# Patient Record
Sex: MALE | Race: WHITE | NOT HISPANIC OR LATINO | Employment: FULL TIME | URBAN - METROPOLITAN AREA
[De-identification: names, ages, dates, MRNs, and addresses within clinical notes are randomized per-mention and may not be internally consistent; named-entity substitution may affect disease eponyms.]

---

## 2019-05-28 ENCOUNTER — TREATMENT (OUTPATIENT)
Dept: PHYSICAL THERAPY | Facility: CLINIC | Age: 44
End: 2019-05-28

## 2019-05-28 ENCOUNTER — TRANSCRIBE ORDERS (OUTPATIENT)
Dept: PHYSICAL THERAPY | Facility: CLINIC | Age: 44
End: 2019-05-28

## 2019-05-28 DIAGNOSIS — S46.912A STRAIN OF LEFT SHOULDER, INITIAL ENCOUNTER: Primary | ICD-10-CM

## 2019-05-28 DIAGNOSIS — M25.512 ACUTE PAIN OF LEFT SHOULDER: Primary | ICD-10-CM

## 2019-05-28 PROCEDURE — 97161 PT EVAL LOW COMPLEX 20 MIN: CPT | Performed by: PHYSICAL THERAPIST

## 2019-05-28 PROCEDURE — 97110 THERAPEUTIC EXERCISES: CPT | Performed by: PHYSICAL THERAPIST

## 2019-05-28 NOTE — PROGRESS NOTES
Physical Therapy Initial Evaluation and Plan of Care        Subjective Evaluation    History of Present Illness  Mechanism of injury: Patient reports that he was attempting to lift a   up into storage container with several other employees. He states that he did not initially feel anything however 45 minutes later his shoulder started to bother him and progressively got worse throughout the day.  He is left handed.  He has a history of right Rotator cuff repair on the right and states that his left shoulder feels like his right did.        Patient Occupation: [employer]   Precautions and Work Restrictions: Light dutyQuality of life: good    Pain  Current pain rating: 3  At worst pain ratin  Quality: throbbing, radiating, sharp and dull ache  Relieving factors: medications and ice  Aggravating factors: outstretched reach, repetitive movement, overhead activity and lifting  Progression: worsening    Social Support  Lives with: spouse    Hand dominance: left    Treatments  Previous treatment: medication  Patient Goals  Patient goals for therapy: independence with ADLs/IADLs, increased strength, decreased pain and increased motion             Objective       Active Range of Motion   Left Shoulder   Flexion: 140 degrees   Abduction: 130 degrees   External rotation 45°: 60 degrees   Internal rotation 45°: 50 degrees     Strength/Myotome Testing     Left Shoulder     Planes of Motion   Flexion: 4-   Abduction: 4-   External rotation at 0°: 3+   Internal rotation at 0°: 4-     Tests     Left Shoulder   Positive active compression ([place]) and empty can.          Assessment & Plan     Assessment  Impairments: abnormal or restricted ROM, activity intolerance, lacks appropriate home exercise program and pain with function  Assessment details: Patient is a 43 y/o male who presents with increased left shoulder pain following a work related injury.  Upon evaluation he has limited AROM and strength throughout left  shoulder, specifically in ER at 0 degrees. He has positive full and empty can and [place] for possible internal derangement. Increased tenderness and guarding of left upper trap.   Prognosis: good  Functional Limitations: carrying objects, lifting, pulling, pushing, uncomfortable because of pain and reaching overhead  Goals  Plan Goals: Long Term Goals (8 weeks)    Patient is able to return to work/community activities with minimal to no discomfort  Patient is able to lift 45 lbs from floor to waist  Patient is able to lift 25 lbs from waist to shoulder  Patient is able to lift 15 lbs from shoulder to overhead  Patient is able to work overhead as needed for work/community activities.       Short Term Goals (4 weeks)    Patient is independent with HEP  Patient is able to sleep without being disrupted by pain.   Patient has full AROM/PROM of left shoulder  Patient has greater than 50% improvement overall.   Patient is able to perform bathing and grooming activities with use of involved UE extremity.                 Plan  Therapy options: will be seen for skilled physical therapy services  Planned modality interventions: TENS, ultrasound, thermotherapy (hydrocollator packs) and cryotherapy  Planned therapy interventions: manual therapy, soft tissue mobilization, strengthening, stretching, therapeutic activities, joint mobilization, home exercise program, functional ROM exercises, flexibility and body mechanics training  Frequency: 3x week  Duration in weeks: 8  Treatment plan discussed with: patient        Manual Therapy:    0     mins  79733;  Therapeutic Exercise:    20     mins  34598;     Neuromuscular Te:    0    mins  66920;    Therapeutic Activity:     0     mins  16310;     Gait Trainin     mins  74615;     Ultrasound:     0     mins  26316;    Work Hardening           0      mins 05101  Iontophoresis               0   mins 47373    Timed Treatment:   20   mins   Total Treatment:     50   mins    PT  SIGNATURE: Lily Briseno, PT   DATE TREATMENT INITIATED: 5/28/2019    Initial Certification  Certification Period: 8/26/2019  I certify that the therapy services are furnished while this patient is under my care.  The services outlined above are required by this patient, and will be reviewed every 90 days.     PHYSICIAN:       DATE:     Please sign and return via fax to [phone].. Thank you, [facility].

## 2019-05-31 ENCOUNTER — TREATMENT (OUTPATIENT)
Dept: PHYSICAL THERAPY | Facility: CLINIC | Age: 44
End: 2019-05-31

## 2019-05-31 DIAGNOSIS — M25.512 ACUTE PAIN OF LEFT SHOULDER: Primary | ICD-10-CM

## 2019-05-31 DIAGNOSIS — S46.912D STRAIN OF LEFT SHOULDER, SUBSEQUENT ENCOUNTER: ICD-10-CM

## 2019-05-31 PROCEDURE — 97110 THERAPEUTIC EXERCISES: CPT | Performed by: PHYSICAL THERAPIST

## 2019-05-31 PROCEDURE — 97530 THERAPEUTIC ACTIVITIES: CPT | Performed by: PHYSICAL THERAPIST

## 2019-05-31 NOTE — PROGRESS NOTES
"Physical Therapy Progress Note      5/31/2019  Lina Rivera APRN    Re: Constantin Hutchison  ________________________________________________________________    Mr. Constantin Hutchison, has attended 2/2 PT sessions.  Treatment has consisted of: patient education, therapeutic activity, and  therapeutic exercise.    S: Mr. Constantin Hutchison states: \"Shoulder is about the same.  I have been doing the exercises.  My pain really depends on how I move or what positions I get in. I'm pretty sure it's my rotator cuff; it feels like my right one did.\"    Subjective Evaluation    Pain  Pain scale: 1-2/10.  Location: (L) superior shoulder to lateral upper arm           Objective       Active Range of Motion   Left Shoulder   Flexion: 152 (pain begins at approx 80 deg flexion) degrees with pain  Extension: 50 (primarily tightness) degrees   Abduction: 102 (pain begins at approx 60 deg) degrees with pain  External rotation 90°: 48 degrees with pain  Internal rotation 90°: 43 degrees with pain    Strength/Myotome Testing     Additional Strength Details  Not assessed due to 2nd visit status     See Exercise, Manual, and Modality Logs for complete treatment.       Assessment & Plan     Assessment  Assessment details: Patient has been compliant with limited course of PT thus far, though he reports no improvement in symptoms.  Objective findings remain largely unchanged, though abduction AROM is less than at initial evaluation.  He experiences pain beginning at approx 80 deg AROM flexion and 60 deg AROM abduction.  There is concern for the possibility of internal derangement, but patient may benefit from a short course of continued PT in effort to improve pain and ROM.  Please advise after your exam.        Awaiting MD orders - patient to f/u with MD at 5pm this evening.         Manual Therapy:         mins  08659;  Therapeutic Exercise:    29     mins  74292;     Neuromuscular Te:        mins  54044;    Therapeutic " Activity:     9     mins  94837;     Gait Training:           mins  33472;     Ultrasound:          mins  26965;    Electrical Stimulation:         mins  83986 ( );  Dry Needling          mins self-pay    Timed Treatment:   38   mins   Total Treatment:     40   mins    Marion Mata PT, DPT  Physical Therapist  [place] License # [id number]

## 2019-06-03 ENCOUNTER — TREATMENT (OUTPATIENT)
Dept: PHYSICAL THERAPY | Facility: CLINIC | Age: 44
End: 2019-06-03

## 2019-06-03 DIAGNOSIS — S46.912D STRAIN OF LEFT SHOULDER, SUBSEQUENT ENCOUNTER: ICD-10-CM

## 2019-06-03 DIAGNOSIS — M25.512 ACUTE PAIN OF LEFT SHOULDER: Primary | ICD-10-CM

## 2019-06-03 PROCEDURE — 97014 ELECTRIC STIMULATION THERAPY: CPT | Performed by: PHYSICAL THERAPIST

## 2019-06-03 PROCEDURE — 97110 THERAPEUTIC EXERCISES: CPT | Performed by: PHYSICAL THERAPIST

## 2019-06-03 NOTE — PROGRESS NOTES
"Physical Therapy Daily Progress Note    Constantin Pattersonreimaryjo reports: \"The doctor is going to try to get the MRI pushed through.  I am waiting on it to get scheduled.  I leave tomorrow morning to go to [place] for one week for work.  She wants me to continue more therapy to keep my shoulder moving after I get back too.\"    Subjective     Objective   See Exercise, Manual, and Modality Logs for complete treatment.   *Initiated post and inf capsule stretches    Assessment & Plan     Assessment  Assessment details: Patient reports no significant improvement in (L) shoulder symptoms with PT efforts thus far despite HEP compliance.  He is able to initiate posterior and inferior capsular stretches with mild symptom irritation getting into/out of position but no symptom provocation during stretch itself.  He is unable to determine any significant change in symptoms immediately following ice and estim treatment this date.        Progress per Plan of Care with emphasis on shoulder joint mobility and soft tissue stretching.         Manual Therapy:         mins  70217;  Therapeutic Exercise:    36     mins  22962;     Neuromuscular Te:        mins  17224;    Therapeutic Activity:          mins  52244;     Gait Training:           mins  44497;     Ultrasound:          mins  43179;    Electrical Stimulation:    15     mins  03868 ( );  Dry Needling          mins self-pay    Timed Treatment:   36   mins   Total Treatment:     54   mins    Marion Mata PT, DPT  Physical Therapist  [place] License # [id number]    "

## 2019-06-10 ENCOUNTER — TREATMENT (OUTPATIENT)
Dept: PHYSICAL THERAPY | Facility: CLINIC | Age: 44
End: 2019-06-10

## 2019-06-10 DIAGNOSIS — M25.512 ACUTE PAIN OF LEFT SHOULDER: Primary | ICD-10-CM

## 2019-06-10 DIAGNOSIS — S46.912D STRAIN OF LEFT SHOULDER, SUBSEQUENT ENCOUNTER: ICD-10-CM

## 2019-06-10 PROCEDURE — 97110 THERAPEUTIC EXERCISES: CPT | Performed by: PHYSICAL THERAPIST

## 2019-06-10 PROCEDURE — 97014 ELECTRIC STIMULATION THERAPY: CPT | Performed by: PHYSICAL THERAPIST

## 2019-06-10 NOTE — PROGRESS NOTES
"Physical Therapy Daily Progress Note      Visit # 4      Subjective Evaluation    History of Present Illness    Subjective comment: Patient reports that his shoulder is still sore.  \"It just depends on the way I move it\".  Still waiting on MRI.        Objective   See Exercise, Manual, and Modality Logs for complete treatment.       Assessment & Plan     Assessment  Assessment details: Patient tolerated treatment fairly well. His ROM continues to improve and near full limits.  Still has pain with functional tasks. Awaiting MRI.     Plan  Plan details: Progress as tolerated.                      Manual Therapy:    0     mins  31761;  Therapeutic Exercise:    45     mins  82963;     Neuromuscular Te:    0    mins  69783;    Therapeutic Activity:     0     mins  75111;     Gait Trainin     mins  88124;     Ultrasound:     0     mins  20697;    Work Hardening           0      mins 70130  Iontophoresis               0   mins 10307    Timed Treatment:   45   mins   Total Treatment:     60   mins    Lily Briseno, PT  Physical Therapist  "

## 2019-06-12 ENCOUNTER — TREATMENT (OUTPATIENT)
Dept: PHYSICAL THERAPY | Facility: CLINIC | Age: 44
End: 2019-06-12

## 2019-06-12 DIAGNOSIS — S46.912D STRAIN OF LEFT SHOULDER, SUBSEQUENT ENCOUNTER: ICD-10-CM

## 2019-06-12 DIAGNOSIS — M25.512 ACUTE PAIN OF LEFT SHOULDER: Primary | ICD-10-CM

## 2019-06-12 PROCEDURE — 97014 ELECTRIC STIMULATION THERAPY: CPT | Performed by: PHYSICAL THERAPIST

## 2019-06-12 PROCEDURE — 97110 THERAPEUTIC EXERCISES: CPT | Performed by: PHYSICAL THERAPIST

## 2019-06-12 NOTE — PROGRESS NOTES
"Physical Therapy Daily Progress Note    Constantin Pattersonrandeeginger reports: \"My shoulder doesn't feel too bad on the moment but it depends on what I do and what position I get in.  I'm in the middle of a he said/she said trying to get the MRI done.\"    Subjective     Objective   See Exercise, Manual, and Modality Logs for complete treatment.   *Initiated serratus punches    Assessment & Plan     Assessment  Assessment details: Patient verbalizes no significant symptom improvement and expresses frustration at apparent lack of communication leading to no MRI approval yet.  He exhibits notably decreased (L) shoulder external rotation strength compared to (R) UE accompanied by pain.  Return to neutral from position of upper thoracic rotation is also reportedly painful and limited.  Anticipate 1 additional PT session to finalize current HEP as s/s allow.        Progress per Plan of Care. Await approval and scheduling of MRI. Reassess (L) shoulder AROM and strength.         Manual Therapy:         mins  16790;  Therapeutic Exercise:    47     mins  77909;     Neuromuscular Te:        mins  04083;    Therapeutic Activity:          mins  22289;     Gait Training:           mins  72870;     Ultrasound:          mins  52778;    Electrical Stimulation:    15     mins  79746 ( );  Dry Needling          mins self-pay    Timed Treatment:   47   mins   Total Treatment:     59   mins    Marion Mata PT, DPT  Physical Therapist  [place] License # [id number]    "

## 2019-06-14 ENCOUNTER — TREATMENT (OUTPATIENT)
Dept: PHYSICAL THERAPY | Facility: CLINIC | Age: 44
End: 2019-06-14

## 2019-06-14 DIAGNOSIS — S46.912D STRAIN OF LEFT SHOULDER, SUBSEQUENT ENCOUNTER: ICD-10-CM

## 2019-06-14 DIAGNOSIS — M25.512 ACUTE PAIN OF LEFT SHOULDER: Primary | ICD-10-CM

## 2019-06-14 PROCEDURE — 97530 THERAPEUTIC ACTIVITIES: CPT | Performed by: PHYSICAL THERAPIST

## 2019-06-14 PROCEDURE — 97014 ELECTRIC STIMULATION THERAPY: CPT | Performed by: PHYSICAL THERAPIST

## 2019-06-14 PROCEDURE — 97110 THERAPEUTIC EXERCISES: CPT | Performed by: PHYSICAL THERAPIST

## 2019-06-14 NOTE — PROGRESS NOTES
"Physical Therapy Daily Progress Note    Constantin Foster reports: \"My shoulder doesn't feel too bad today, but I also haven't been allowed to use it.  I still don't have the MRI approved. It sounds like the doctor's office and work comp are playing phone tag.\"    Subjective     Objective       Active Range of Motion   Left Shoulder   Flexion: 145 degrees with pain  Abduction: 139 degrees with pain  External rotation 90°: 47 degrees with pain  Internal rotation 90°: 50 degrees with pain    Strength/Myotome Testing     Left Shoulder     Planes of Motion   Flexion: 4- (with pain)   Extension: 4   Abduction: 4- (with pain)   External rotation at 0°: 3+ (with pain)   Internal rotation at 0°: 4      See Exercise, Manual, and Modality Logs for complete treatment.       Assessment & Plan     Assessment  Assessment details: Patient demonstrates no significant improvement in (L) shoulder AROM and in fact decreased external rotation AROM.  He expresses some frustration that MRI has not yet been scheduled and he has now attended all approved PT sessions.  Encouraged persistent HEP compliance while he awaits approval of imaging in order to maintain as much (L) shoulder ROM as possible and he verbalizes understanding.        Other - await approval and scheduling of MRI.         Manual Therapy:         mins  62901;  Therapeutic Exercise:    26     mins  79048;     Neuromuscular Te:        mins  15461;    Therapeutic Activity:     12     mins  28104;     Gait Training:           mins  20893;     Ultrasound:          mins  46551;    Electrical Stimulation:    15     mins  50747 ( );  Dry Needling          mins self-pay    Timed Treatment:   38   mins   Total Treatment:     53   mins    Marion Mata PT, DPT  Physical Therapist  [place] License # [id number]    "

## 2019-06-25 ENCOUNTER — TRANSCRIBE ORDERS (OUTPATIENT)
Dept: ADMINISTRATIVE | Facility: HOSPITAL | Age: 44
End: 2019-06-25

## 2019-06-25 DIAGNOSIS — S46.912A SHOULDER STRAIN, LEFT, INITIAL ENCOUNTER: Primary | ICD-10-CM

## 2019-07-09 ENCOUNTER — HOSPITAL ENCOUNTER (OUTPATIENT)
Dept: MRI IMAGING | Facility: HOSPITAL | Age: 44
Discharge: HOME | End: 2019-07-09
Admitting: NURSE PRACTITIONER

## 2019-07-09 ENCOUNTER — HOSPITAL ENCOUNTER (OUTPATIENT)
Dept: GENERAL RADIOLOGY | Facility: HOSPITAL | Age: 44
Discharge: HOME | End: 2019-07-09

## 2019-07-09 DIAGNOSIS — S46.912A SHOULDER STRAIN, LEFT, INITIAL ENCOUNTER: ICD-10-CM

## 2019-07-09 PROCEDURE — 77002 NEEDLE LOCALIZATION BY XRAY: CPT

## 2019-07-09 PROCEDURE — 0 GADOBENATE DIMEGLUMINE 529 MG/ML SOLUTION: Performed by: RADIOLOGY

## 2019-07-09 PROCEDURE — 73222 MRI JOINT UPR EXTREM W/DYE: CPT

## 2019-07-09 PROCEDURE — 25010000003 LIDOCAINE 1 % SOLUTION: Performed by: RADIOLOGY

## 2019-07-09 PROCEDURE — A9577 INJ MULTIHANCE: HCPCS | Performed by: RADIOLOGY

## 2019-07-09 PROCEDURE — 25010000002 IOPAMIDOL 61 % SOLUTION: Performed by: RADIOLOGY

## 2019-07-09 RX ORDER — LIDOCAINE HYDROCHLORIDE 10 MG/ML
20 INJECTION, SOLUTION INFILTRATION; PERINEURAL ONCE
Status: COMPLETED | OUTPATIENT
Start: 2019-07-09 | End: 2019-07-09

## 2019-07-09 RX ADMIN — LIDOCAINE HYDROCHLORIDE 2 ML: 10 INJECTION, SOLUTION INFILTRATION; PERINEURAL at 13:20

## 2019-07-09 RX ADMIN — GADOBENATE DIMEGLUMINE 0.05 ML: 529 INJECTION, SOLUTION INTRAVENOUS at 13:20

## 2019-07-09 RX ADMIN — IOPAMIDOL 2 ML: 612 INJECTION, SOLUTION INTRAVENOUS at 13:20

## 2019-07-16 ENCOUNTER — TREATMENT (OUTPATIENT)
Dept: PHYSICAL THERAPY | Facility: CLINIC | Age: 44
End: 2019-07-16

## 2019-07-16 DIAGNOSIS — M25.512 ACUTE PAIN OF LEFT SHOULDER: Primary | ICD-10-CM

## 2019-07-16 DIAGNOSIS — S46.912D STRAIN OF LEFT SHOULDER, SUBSEQUENT ENCOUNTER: ICD-10-CM

## 2019-07-16 PROCEDURE — 97530 THERAPEUTIC ACTIVITIES: CPT | Performed by: PHYSICAL THERAPIST

## 2019-07-16 PROCEDURE — 97014 ELECTRIC STIMULATION THERAPY: CPT | Performed by: PHYSICAL THERAPIST

## 2019-07-16 PROCEDURE — 97110 THERAPEUTIC EXERCISES: CPT | Performed by: PHYSICAL THERAPIST

## 2019-07-16 NOTE — PROGRESS NOTES
Physical Therapy Daily Progress Note      Visit # 7      Subjective Evaluation    History of Present Illness    Subjective comment: The shoulder feels like crap.  MRI done and he was told his shoulder was a mess.  He is being referred to orthopedic surgeon. Pain  Current pain rating: 3  At worst pain ratin           Objective   See Exercise, Manual, and Modality Logs for complete treatment.       Assessment/Plan                  Manual Therapy:         mins  48873;  Therapeutic Exercise:    26     mins  99727;     Neuromuscular Te:       mins  92889;    Therapeutic Activity:     12     mins  65194;     Gait Training:           mins  27737;     Ultrasound:          mins  11360;    Work Hardening                 mins 84106  Iontophoresis                  mins 35676    Timed Treatment:   38   mins   Total Treatment:     53   mins    Jessica Dickerson, PT  Physical Therapist

## 2019-07-17 ENCOUNTER — TREATMENT (OUTPATIENT)
Dept: PHYSICAL THERAPY | Facility: CLINIC | Age: 44
End: 2019-07-17

## 2019-07-17 DIAGNOSIS — M25.512 ACUTE PAIN OF LEFT SHOULDER: Primary | ICD-10-CM

## 2019-07-17 PROCEDURE — 97014 ELECTRIC STIMULATION THERAPY: CPT | Performed by: PHYSICAL THERAPIST

## 2019-07-17 PROCEDURE — 97110 THERAPEUTIC EXERCISES: CPT | Performed by: PHYSICAL THERAPIST

## 2019-07-17 NOTE — PROGRESS NOTES
Physical Therapy Daily Progress Note    Constantin Hutchison reports: Still waiting on approval from  contact to schedule appointment with the orthopedic surgeon.      Subjective     Objective   See Exercise, Manual, and Modality Logs for complete treatment.       Assessment & Plan     Assessment  Assessment details: During PT session, patient receives phone call from  adjustor stating that referral to orthopedic surgeon has been approved.  He also receives a separate phone call from [facility] referral department stating phone call has been placed to schedule appointment.  Patient is able to perform most activities without significant symptom provocation and for the time being demonstrates near full AAROM (L) shoulder flexion, scaption, and ER.        Progress per Plan of Care as s/s allow.         Manual Therapy:         mins  57995;  Therapeutic Exercise:    38     mins  43700;     Neuromuscular Te:        mins  88104;    Therapeutic Activity:          mins  34346;     Gait Training:           mins  06234;     Ultrasound:          mins  12592;    Electrical Stimulation:    15     mins  19227 ( );  Dry Needling          mins self-pay    Timed Treatment:   38   mins   Total Treatment:     58   mins    Marion Mata PT, DPT  Physical Therapist  [place] License # [id number]

## 2019-07-19 ENCOUNTER — TREATMENT (OUTPATIENT)
Dept: PHYSICAL THERAPY | Facility: CLINIC | Age: 44
End: 2019-07-19

## 2019-07-19 DIAGNOSIS — M25.512 ACUTE PAIN OF LEFT SHOULDER: Primary | ICD-10-CM

## 2019-07-19 DIAGNOSIS — S46.912D STRAIN OF LEFT SHOULDER, SUBSEQUENT ENCOUNTER: ICD-10-CM

## 2019-07-19 PROCEDURE — 97014 ELECTRIC STIMULATION THERAPY: CPT | Performed by: PHYSICAL THERAPIST

## 2019-07-19 PROCEDURE — 97110 THERAPEUTIC EXERCISES: CPT | Performed by: PHYSICAL THERAPIST

## 2019-07-19 NOTE — PROGRESS NOTES
Physical Therapy Daily Progress Note    Constantin Hutchison reports: Ortho appt is scheduled for Aug 5. Shoulder feels about the same.    Subjective     Objective   See Exercise, Manual, and Modality Logs for complete treatment.   *Initiated prone I's    Assessment & Plan     Assessment  Assessment details: Patient is able to initiate prone scapular strengthening without symptom provocation but does exhibit signs of early onset of muscular fatigue.  He demonstrates good therapeutic exercise recall substantiating reports of patient compliance with HEP.  His ortho appointment has now been approved and scheduled. Patient remains limited by pain and functional strength in most ADLs and work activities.        Progress per Plan of Care as s/s allow.         Manual Therapy:         mins  16687;  Therapeutic Exercise:    40     mins  20497;     Neuromuscular Te:        mins  85953;    Therapeutic Activity:          mins  21950;     Gait Training:           mins  54882;     Ultrasound:          mins  17197;    Electrical Stimulation:    15     mins  59808 ( );  Dry Needling          mins self-pay    Timed Treatment:   40   mins   Total Treatment:     64   mins    Marion Mata PT, DPT  Physical Therapist  [place] License # [id number]

## 2019-07-22 ENCOUNTER — TREATMENT (OUTPATIENT)
Dept: PHYSICAL THERAPY | Facility: CLINIC | Age: 44
End: 2019-07-22

## 2019-07-22 DIAGNOSIS — S46.912D STRAIN OF LEFT SHOULDER, SUBSEQUENT ENCOUNTER: ICD-10-CM

## 2019-07-22 DIAGNOSIS — M25.512 ACUTE PAIN OF LEFT SHOULDER: Primary | ICD-10-CM

## 2019-07-22 PROCEDURE — 97014 ELECTRIC STIMULATION THERAPY: CPT | Performed by: PHYSICAL THERAPIST

## 2019-07-22 PROCEDURE — 97110 THERAPEUTIC EXERCISES: CPT | Performed by: PHYSICAL THERAPIST

## 2019-07-22 NOTE — PROGRESS NOTES
Physical Therapy Daily Progress Note    Constantin Hutchison reports: My shoulder feels about the same.  I got the paperwork from the ortho doc so I have that filled out.     Subjective     Objective   See Exercise, Manual, and Modality Logs for complete treatment.       Assessment & Plan     Assessment  Assessment details: Patient remains compliant and cooperative with PT efforts.  He is able to progress scapular strengthening at a very gradual rate but is not able to progress shoulder specific strengthening activities at this time due to pain.  He responds positively to modality application for short duration. Awaiting outcome of initial appointment with orthopedic MD to determine the reality of any internal derangement within the shoulder joint.        Progress per Plan of Care         Manual Therapy:         mins  90220;  Therapeutic Exercise:    38     mins  43408;     Neuromuscular Te:        mins  16521;    Therapeutic Activity:          mins  21605;     Gait Training:           mins  00782;     Ultrasound:          mins  22240;    Electrical Stimulation:    15     mins  67584 ( );  Dry Needling          mins self-pay    Timed Treatment:   38   mins   Total Treatment:     59   mins    Marion Mata PT, DPT  Physical Therapist  [place] License # [id number]

## 2019-07-24 ENCOUNTER — TREATMENT (OUTPATIENT)
Dept: PHYSICAL THERAPY | Facility: CLINIC | Age: 44
End: 2019-07-24

## 2019-07-24 DIAGNOSIS — M25.512 ACUTE PAIN OF LEFT SHOULDER: Primary | ICD-10-CM

## 2019-07-24 DIAGNOSIS — S46.912D STRAIN OF LEFT SHOULDER, SUBSEQUENT ENCOUNTER: ICD-10-CM

## 2019-07-24 PROCEDURE — 97014 ELECTRIC STIMULATION THERAPY: CPT | Performed by: PHYSICAL THERAPIST

## 2019-07-24 PROCEDURE — 97110 THERAPEUTIC EXERCISES: CPT | Performed by: PHYSICAL THERAPIST

## 2019-07-24 NOTE — PROGRESS NOTES
Physical Therapy Daily Progress Note    Constantin Foster reports: No change in shoulder symptoms.  Just 'the usual' - some days throbs, some days it doesn't hurt at all.  No rhyme or reason that I can tell.  It was pretty sore this morning when I woke up but that's pretty normal. It's not bothering me too bad right now.    Subjective     Objective   See Exercise, Manual, and Modality Logs for complete treatment.   *Increased repetitions of prone I's    Assessment & Plan     Assessment  Assessment details: Patient experiences no significant symptom provocation during exercise performance this date.  Strengthening activities for the shoulder are avoided due to prior symptom exacerbation but he is able to tolerate light scapular strengthening activities without adverse response.  Overall exercise progression, however, has been moderately limited by (L) shoulder symptoms and persistent concern for possible internal derangement.        Progress per Plan of Care. Consider LPD and bent over row.         Manual Therapy:         mins  49402;  Therapeutic Exercise:    33    mins  68836;     Neuromuscular Te:        mins  04616;    Therapeutic Activity:          mins  87145;     Gait Training:           mins  62708;     Ultrasound:          mins  76918;    Electrical Stimulation:    15     mins  83115 ( );  Dry Needling          mins self-pay    Timed Treatment:   33   mins   Total Treatment:     54   mins    Marion Mata PT, DPT  Physical Therapist  [place] License # [id number]

## 2019-07-26 ENCOUNTER — TREATMENT (OUTPATIENT)
Dept: PHYSICAL THERAPY | Facility: CLINIC | Age: 44
End: 2019-07-26

## 2019-07-26 DIAGNOSIS — S46.912D STRAIN OF LEFT SHOULDER, SUBSEQUENT ENCOUNTER: ICD-10-CM

## 2019-07-26 DIAGNOSIS — M25.512 ACUTE PAIN OF LEFT SHOULDER: Primary | ICD-10-CM

## 2019-07-26 PROCEDURE — 97014 ELECTRIC STIMULATION THERAPY: CPT | Performed by: PHYSICAL THERAPIST

## 2019-07-26 PROCEDURE — 97110 THERAPEUTIC EXERCISES: CPT | Performed by: PHYSICAL THERAPIST

## 2019-07-26 NOTE — PROGRESS NOTES
Physical Therapy Daily Progress Note    Constantin Hutchison reports: Shoulder is sore today.  I haven't done anything out of the ordinary with it.  It actually wasn't feeling too bad yesterday.    Subjective     Objective   See Exercise, Manual, and Modality Logs for complete treatment.   *Initiated bent over rows with 3# and LPDs vs red    Assessment & Plan     Assessment  Assessment details: Patient is able to progress scapular strengthening via bent over rows and LPDs this date without symptom provocation.  Activities which do not require flexion or abduction of the (L) UE away from the body appear to be tolerable but patient is unable to lift 3# weight in flexion to 90 deg in front of the body.          Progress per Plan of Care         Manual Therapy:         mins  03339;  Therapeutic Exercise:    38     mins  63475;     Neuromuscular Te:        mins  14940;    Therapeutic Activity:          mins  40782;     Gait Training:           mins  95270;     Ultrasound:          mins  04769;    Electrical Stimulation:    15     mins  45387 ( );  Dry Needling          mins self-pay    Timed Treatment:   38   mins   Total Treatment:     59   mins    Marion Mata PT, DPT  Physical Therapist  [place] License # [id number]

## 2019-07-29 ENCOUNTER — TREATMENT (OUTPATIENT)
Dept: PHYSICAL THERAPY | Facility: CLINIC | Age: 44
End: 2019-07-29

## 2019-07-29 DIAGNOSIS — S46.912D STRAIN OF LEFT SHOULDER, SUBSEQUENT ENCOUNTER: ICD-10-CM

## 2019-07-29 DIAGNOSIS — M25.512 ACUTE PAIN OF LEFT SHOULDER: Primary | ICD-10-CM

## 2019-07-29 PROCEDURE — 97110 THERAPEUTIC EXERCISES: CPT | Performed by: PHYSICAL THERAPIST

## 2019-07-29 PROCEDURE — 97014 ELECTRIC STIMULATION THERAPY: CPT | Performed by: PHYSICAL THERAPIST

## 2019-07-29 NOTE — PROGRESS NOTES
"Physical Therapy Daily Progress Note    Constantin Hutchison reports: \"My shoulder is pretty stiff and sore when I move it today.  It's anywhere from a 2/10 to an 8/10, depending on what I'm doing.\"    Subjective     Objective   See Exercise, Manual, and Modality Logs for complete treatment.     Assessment & Plan     Assessment  Assessment details: Attempted weight progression with bent over row to 5# but patient unable to complete due to pain.  Patient's symptoms have not improved to any significant degree with PT efforts, and tolerance to functional tasks with the (L) UE remains quite limited by pain and strength deficits.          Progress per Plan of Care as s/s allow.          Manual Therapy:         mins  50915;  Therapeutic Exercise:    41     mins  85426;     Neuromuscular Te:        mins  35142;    Therapeutic Activity:          mins  62272;     Gait Training:           mins  17401;     Ultrasound:          mins  18099;    Electrical Stimulation:    15     mins  41756 ( );  Dry Needling          mins self-pay    Timed Treatment:   41   mins   Total Treatment:     62   mins    Marion Mata PT, DPT  Physical Therapist  [place] License # [id number]    "

## 2019-07-31 ENCOUNTER — TREATMENT (OUTPATIENT)
Dept: PHYSICAL THERAPY | Facility: CLINIC | Age: 44
End: 2019-07-31

## 2019-07-31 DIAGNOSIS — S46.912D STRAIN OF LEFT SHOULDER, SUBSEQUENT ENCOUNTER: ICD-10-CM

## 2019-07-31 DIAGNOSIS — M25.512 ACUTE PAIN OF LEFT SHOULDER: Primary | ICD-10-CM

## 2019-07-31 PROCEDURE — 97110 THERAPEUTIC EXERCISES: CPT | Performed by: PHYSICAL THERAPIST

## 2019-07-31 NOTE — PROGRESS NOTES
Physical Therapy Daily Progress Note    Constantin Hutchison reports: No change in left shoulder symptoms or function.    Subjective     Objective   See Exercise, Manual, and Modality Logs for complete treatment.     Assessment & Plan     Assessment  Assessment details: Patient is managing several work issues and declines modalities this date due to time constraints and needing to return to work.  Despite PT compliance including regular attendance, patient demonstrates no significant symptom or functional improvements re: (L) shoulder. Concern persists for the possibility of internal derangement.        Other - reassess/final PT visit.         Manual Therapy:         mins  35412;  Therapeutic Exercise:    24     mins  64039;     Neuromuscular Te:        mins  79532;    Therapeutic Activity:          mins  83963;     Gait Training:           mins  92804;     Ultrasound:          mins  36452;    Electrical Stimulation:         mins  13461 ( );  Dry Needling          mins self-pay    Timed Treatment:   24   mins   Total Treatment:     37   mins    Marion Mata PT, DPT  Physical Therapist  [place] License # [id number]

## 2019-08-02 ENCOUNTER — TREATMENT (OUTPATIENT)
Dept: PHYSICAL THERAPY | Facility: CLINIC | Age: 44
End: 2019-08-02

## 2019-08-02 DIAGNOSIS — S46.912D STRAIN OF LEFT SHOULDER, SUBSEQUENT ENCOUNTER: ICD-10-CM

## 2019-08-02 DIAGNOSIS — M25.512 ACUTE PAIN OF LEFT SHOULDER: Primary | ICD-10-CM

## 2019-08-02 PROCEDURE — 97014 ELECTRIC STIMULATION THERAPY: CPT | Performed by: PHYSICAL THERAPIST

## 2019-08-02 PROCEDURE — 97110 THERAPEUTIC EXERCISES: CPT | Performed by: PHYSICAL THERAPIST

## 2019-08-02 NOTE — PROGRESS NOTES
"Physical Therapy Progress Note    2019  Dr. Mckeon    Re: Constantin Hutchison  ________________________________________________________________    Mr. Constantin Hutchison, has attended 15/15 PT sessions.  Treatment has consisted of: patient education, therapeutic activity, therapeutic exercise, manual therapy, and modalities.     S: Mr. Constantin Hutchison states: \"My shoulder is pretty much the same.  I have decent motion in it but it is not painfree motion, and there are definite strength deficits. Anything reaching overhead or especially with weight overhead is really difficult, and I have no strength lifting anything even like a coffee cup straight out in front of me. I can't get comfortable to sleep at night so I toss and turn a lot.\"    Subjective Evaluation    Pain  Pain scale: 3-4/10.  At worst pain ratin  Location: (L) superior shoulder, upper arm, posterior shoulder           Objective       Active Range of Motion   Left Shoulder   Flexion: 143 (pain begins at approx 80 deg) degrees with pain  Extension: 53 degrees   Abduction: 112 degrees with pain  External rotation 90°: 67 degrees with pain  Internal rotation 90°: 43 degrees with pain    Strength/Myotome Testing     Left Shoulder     Planes of Motion   Flexion: 4+   Extension: 5   Abduction: 4   External rotation at 0°: 4- (with pain)   Internal rotation at 0°: 5     Tests     Left Shoulder   Positive active compression ([place]) and empty can.      See Exercise, Manual, and Modality Logs for complete treatment.       Assessment & Plan     Assessment  Assessment details: Patient has been compliant and cooperative with PT efforts.  Despite this, he reports persistent pain and limitation of (L) UE in ADLs and work tasks.  (L) shoulder AROM and strength are limited by pain, and special testing is positive for possible internal derangement.  He has not made the anticipated progress that would typically be expected by this point.  He will benefit " from further medical management and/or imaging at this time.  Please advise after your exam.        Other - patient has initial consult with Dr. Mckeon on Monday, 08/05/19.  Await MD orders.         Manual Therapy:         mins  61969;  Therapeutic Exercise:    40     mins  79899;     Neuromuscular Te:        mins  54180;    Therapeutic Activity:          mins  55923;     Gait Training:           mins  80832;     Ultrasound:          mins  51526;    Electrical Stimulation:    15     mins  04129 ( );  Dry Needling          mins self-pay    Timed Treatment:   40   mins   Total Treatment:     67   mins    Marion Mata PT, DPT  Physical Therapist  [place] License # [id number]

## 2020-01-29 ENCOUNTER — DOCUMENTATION (OUTPATIENT)
Dept: PHYSICAL THERAPY | Facility: CLINIC | Age: 45
End: 2020-01-29

## 2020-08-12 PROCEDURE — U0003 INFECTIOUS AGENT DETECTION BY NUCLEIC ACID (DNA OR RNA); SEVERE ACUTE RESPIRATORY SYNDROME CORONAVIRUS 2 (SARS-COV-2) (CORONAVIRUS DISEASE [COVID-19]), AMPLIFIED PROBE TECHNIQUE, MAKING USE OF HIGH THROUGHPUT TECHNOLOGIES AS DESCRIBED BY CMS-2020-01-R: HCPCS

## 2020-08-16 ENCOUNTER — TELEPHONE (OUTPATIENT)
Dept: URGENT CARE | Facility: CLINIC | Age: 45
End: 2020-08-16

## 2021-02-05 PROCEDURE — U0003 INFECTIOUS AGENT DETECTION BY NUCLEIC ACID (DNA OR RNA); SEVERE ACUTE RESPIRATORY SYNDROME CORONAVIRUS 2 (SARS-COV-2) (CORONAVIRUS DISEASE [COVID-19]), AMPLIFIED PROBE TECHNIQUE, MAKING USE OF HIGH THROUGHPUT TECHNOLOGIES AS DESCRIBED BY CMS-2020-01-R: HCPCS | Performed by: NURSE PRACTITIONER

## 2021-02-12 PROCEDURE — U0003 INFECTIOUS AGENT DETECTION BY NUCLEIC ACID (DNA OR RNA); SEVERE ACUTE RESPIRATORY SYNDROME CORONAVIRUS 2 (SARS-COV-2) (CORONAVIRUS DISEASE [COVID-19]), AMPLIFIED PROBE TECHNIQUE, MAKING USE OF HIGH THROUGHPUT TECHNOLOGIES AS DESCRIBED BY CMS-2020-01-R: HCPCS | Performed by: NURSE PRACTITIONER

## 2021-09-10 ENCOUNTER — APPOINTMENT (OUTPATIENT)
Dept: GENERAL RADIOLOGY | Facility: HOSPITAL | Age: 46
End: 2021-09-10

## 2021-09-10 ENCOUNTER — HOSPITAL ENCOUNTER (EMERGENCY)
Facility: HOSPITAL | Age: 46
Discharge: HOME | End: 2021-09-10
Admitting: EMERGENCY MEDICINE

## 2021-09-10 VITALS
DIASTOLIC BLOOD PRESSURE: 81 MMHG | TEMPERATURE: 98.3 F | HEIGHT: 70 IN | BODY MASS INDEX: 32.51 KG/M2 | RESPIRATION RATE: 16 BRPM | SYSTOLIC BLOOD PRESSURE: 120 MMHG | WEIGHT: 227.07 LBS | HEART RATE: 79 BPM | OXYGEN SATURATION: 99 %

## 2021-09-10 DIAGNOSIS — M79.645 THUMB PAIN, LEFT: Primary | ICD-10-CM

## 2021-09-10 DIAGNOSIS — S68.522A PARTIAL TRAUMATIC AMPUTATION OF LEFT THUMB THROUGH PHALANX, INITIAL ENCOUNTER: ICD-10-CM

## 2021-09-10 PROCEDURE — 25010000002 CEFAZOLIN PER 500 MG: Performed by: NURSE PRACTITIONER

## 2021-09-10 PROCEDURE — 90471 IMMUNIZATION ADMIN: CPT | Performed by: NURSE PRACTITIONER

## 2021-09-10 PROCEDURE — 25010000002 TDAP 5-2.5-18.5 LF-MCG/0.5 SUSPENSION: Performed by: NURSE PRACTITIONER

## 2021-09-10 PROCEDURE — 99283 EMERGENCY DEPT VISIT LOW MDM: CPT

## 2021-09-10 PROCEDURE — 96374 THER/PROPH/DIAG INJ IV PUSH: CPT

## 2021-09-10 PROCEDURE — 99282 EMERGENCY DEPT VISIT SF MDM: CPT

## 2021-09-10 PROCEDURE — 90715 TDAP VACCINE 7 YRS/> IM: CPT | Performed by: NURSE PRACTITIONER

## 2021-09-10 PROCEDURE — 73140 X-RAY EXAM OF FINGER(S): CPT

## 2021-09-10 RX ORDER — SODIUM CHLORIDE 0.9 % (FLUSH) 0.9 %
10 SYRINGE (ML) INJECTION AS NEEDED
Status: DISCONTINUED | OUTPATIENT
Start: 2021-09-10 | End: 2021-09-10 | Stop reason: HOSPADM

## 2021-09-10 RX ORDER — CEFADROXIL 500 MG/1
500 CAPSULE ORAL 2 TIMES DAILY
Qty: 20 CAPSULE | Refills: 0 | Status: SHIPPED | OUTPATIENT
Start: 2021-09-10 | End: 2021-09-20

## 2021-09-10 RX ORDER — HYDROCODONE BITARTRATE AND ACETAMINOPHEN 5; 325 MG/1; MG/1
1 TABLET ORAL ONCE AS NEEDED
Status: DISCONTINUED | OUTPATIENT
Start: 2021-09-10 | End: 2021-09-10 | Stop reason: HOSPADM

## 2021-09-10 RX ADMIN — CEFAZOLIN SODIUM 2 G: 10 INJECTION, POWDER, FOR SOLUTION INTRAVENOUS at 13:37

## 2021-09-10 RX ADMIN — TETANUS TOXOID, REDUCED DIPHTHERIA TOXOID AND ACELLULAR PERTUSSIS VACCINE, ADSORBED 0.5 ML: 5; 2.5; 8; 8; 2.5 SUSPENSION INTRAMUSCULAR at 12:19

## 2021-09-10 NOTE — ED NOTES
Patient cut the tip off of his right thumb with table saw. Fingernail also involved     Karen Dumont RN  09/10/21 1424

## 2021-09-10 NOTE — ED NOTES
Bacitracin, telfa, nita applied to pt's left thumb per NP request; pt clark without difficulty.     Karen Dumont RN  09/10/21 5280

## 2021-09-10 NOTE — DISCHARGE INSTRUCTIONS
Take full course of antibiotic.  Wash thumb wound twice a day with antibacterial soap and water.  Keep clean dry and covered.  Watch for any signs and symptoms of infection, foul odor purulent drainage excessive redness swelling and pain of thumb or hand.  Call Dr. Oakley office for follow-up next week

## 2021-09-10 NOTE — ED PROVIDER NOTES
Subjective   History: Patient is a 46-year-old male complains of laceration to his left thumb from a table saw.  Reports he cut the distal portion of his thumb off with table saw all rinsed it with water wrapped it up and came to the ER.  Denies any blood thinners, tetanus shot not up-to-date.      Onset: 10 AM  Location: Left thumb  Duration: Constant  Character: Throb  Aggravating/Alleviating factors: None  Radiation not applicable  Severity: Moderate            Review of Systems   Constitutional: Negative for chills, fatigue and fever.   HENT: Negative for congestion, sore throat, tinnitus and trouble swallowing.    Eyes: Negative for photophobia, discharge and visual disturbance.   Respiratory: Negative for cough and shortness of breath.    Cardiovascular: Negative for chest pain.   Gastrointestinal: Negative for abdominal pain, diarrhea, nausea and vomiting.   Genitourinary: Negative for dysuria, frequency and urgency.   Musculoskeletal: Negative for back pain and myalgias.   Skin: Positive for wound. Negative for rash.   Neurological: Negative for dizziness and headaches.   Psychiatric/Behavioral: Negative for confusion.       Past Medical History:   Diagnosis Date   • Diabetes mellitus (CMS/[facility])    • GERD (gastroesophageal reflux disease)    • Hyperlipidemia    • Hypertension        Allergies   Allergen Reactions   • Hydrocodone Anxiety     Able to take oxycodone.       Past Surgical History:   Procedure Laterality Date   • HERNIA REPAIR     • ROTATOR CUFF REPAIR Bilateral        History reviewed. No pertinent family history.    Social History     Socioeconomic History   • Marital status:      Spouse name: Not on file   • Number of children: Not on file   • Years of education: Not on file   • Highest education level: Not on file   Tobacco Use   • Smoking status: Never Smoker   • Smokeless tobacco: Never Used   Vaping Use   • Vaping Use: Never used   Substance and Sexual Activity   • Alcohol use: No   •  "Drug use: No           Objective   Physical Exam  Vitals reviewed.   Constitutional:       General: He is not in acute distress.     Appearance: He is normal weight. He is not toxic-appearing.   HENT:      Head: Normocephalic and atraumatic.      Nose: Nose normal.      Mouth/Throat:      Mouth: Mucous membranes are moist.      Pharynx: Oropharynx is clear.   Eyes:      Pupils: Pupils are equal, round, and reactive to light.   Cardiovascular:      Rate and Rhythm: Normal rate.      Pulses: Normal pulses.      Heart sounds: Normal heart sounds. No murmur heard.     Pulmonary:      Effort: Pulmonary effort is normal.      Breath sounds: Normal breath sounds.   Abdominal:      General: Abdomen is flat. Bowel sounds are normal.      Palpations: Abdomen is soft.   Musculoskeletal:         General: Signs of injury present. Normal range of motion.      Cervical back: Normal range of motion.      Comments: Partial amputation distal left thumb with minor nail involvement.  No subungual hematoma noted.  Unable to visualize any bone.  Full range of motion of the IP.  Cap refill less than 2 seconds.  Nail remains intact and undisturbed except for distal portion that has been cut off by table saw.   Skin:     General: Skin is warm and dry.      Findings: No rash.   Neurological:      Mental Status: He is alert.   Psychiatric:         Mood and Affect: Mood normal.         Behavior: Behavior normal.         Thought Content: Thought content normal.         Judgment: Judgment normal.         Procedures           ED Course    /74 (BP Location: Right arm, Patient Position: Sitting)   Pulse 81   Temp 97.4 °F (36.3 °C) (Temporal)   Resp 16   Ht 177.8 cm (70\")   Wt 103 kg (227 lb 1.2 oz)   SpO2 100%   BMI 32.58 kg/m²   Labs Reviewed - No data to display  Medications   HYDROcodone-acetaminophen (NORCO) 5-325 MG per tablet 1 tablet (1 tablet Oral Not Given 9/10/21 1220)   sodium chloride 0.9 % flush 10 mL (has no " administration in time range)   Tdap (BOOSTRIX) injection 0.5 mL (0.5 mL Intramuscular Given 9/10/21 1219)   ceFAZolin (ANCEF) in SWFI 2 g/20ml IV PUSH syringe (2 g Intravenous Given 9/10/21 1337)     XR Finger 2+ View Left    Result Date: 9/10/2021  Partial amputation of the first digit with fracture at distal tuft of first digit distal phalanx at the site of amputation.  No radiodense foreign body.  Electronically Signed By-Hugo Matthews MD On:9/10/2021 12:40 PM This report was finalized on [id number] by  Hugo Matthews MD.                                           MDM     I examined the patient using the appropriate personal protective equipment.      DISPOSITION:   Chart Review: Not applicable  Comorbidity:  has a past medical history of Diabetes mellitus (CMS/HCC), GERD (gastroesophageal reflux disease), Hyperlipidemia, and Hypertension.  Differentials:this list is not all inclusive and does not constitute the entirety of considered causes --> amputation fracture dislocation subungual hematoma  ECG: interpreted by ER physician and reviewed by myself: Not applicable  Labs: Not applicable    Imaging: Was interpreted by physician and reviewed by myself:  XR Finger 2+ View Left    Result Date: 9/10/2021  Partial amputation of the first digit with fracture at distal tuft of first digit distal phalanx at the site of amputation.  No radiodense foreign body.  Electronically Signed By-Hugo Matthews MD On:9/10/2021 12:40 PM This report was finalized on [id number] by  Hugo Matthews MD.      Disposition/Treatment:    Use hydrocodone for pain after discussion with patient he reports he has adverse reaction makes him jittery keeps him up all night does not have a true allergy.  X-ray to left thumb shows partial amputation of the digit with fracture at distal tuft of first digit distal phalanx at the site of amputation.  Patient had IV initiated was given 2 g of Kefzol while in ER.  Wound was cleaned thoroughly by myself  with chlorhexidine scrub brush and 500 mL normal saline.  Dressed with clean nonadherent dressing by primary RN.  Discussed signs symptoms of infection and wound care with patient.  Will be placed on p.o. antibiotic with follow-up with Dr. Ascencio.  I did discuss patient complaint and results with Dr. Ascencio Saw okay with him for ER treatment and was sent home on Duricef 500 mg twice daily x10 days with office follow-up.    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           Where to Get Your Medications      These medications were sent to [facility] [address] [place] [address] [place] - [address] [facility] - [phone]  - [phone] FX  [address] [facility] [place] [address]    Phone: [phone]   · cefadroxil 500 MG capsule          Bridgette Hurst, APRN  09/10/21 2969